# Patient Record
Sex: MALE | NOT HISPANIC OR LATINO | URBAN - METROPOLITAN AREA
[De-identification: names, ages, dates, MRNs, and addresses within clinical notes are randomized per-mention and may not be internally consistent; named-entity substitution may affect disease eponyms.]

---

## 2023-06-02 ENCOUNTER — TELEPHONE (OUTPATIENT)
Dept: UROLOGY | Facility: AMBULATORY SURGERY CENTER | Age: 68
End: 2023-06-02

## 2023-06-02 NOTE — TELEPHONE ENCOUNTER
New Patient    What is the reason for the patient’s appointment?: Issues with urination/Incomplete emptying     What office location does the patient prefer?: [address]     Have patient records been requested?:  If No, are the records showing in Epic: records in [facility] patient states he was only seen by [facility] prior to this       INSURANCE:  Do we accept the patient's insurance or is the patient Self-Pay?: yes    Insurance Provider: [employer]  Plan Type/Number:  Member ID#: [id number]      HISTORY:   Has the patient had any previous Urologist(s)?[facility]     Was the patient seen in the ED?: no     Has the patient had any outside testing done?: no     Does the patient have a personal history of cancer?: no